# Patient Record
Sex: MALE | URBAN - METROPOLITAN AREA
[De-identification: names, ages, dates, MRNs, and addresses within clinical notes are randomized per-mention and may not be internally consistent; named-entity substitution may affect disease eponyms.]

---

## 2023-07-14 ENCOUNTER — OFFICE VISIT (OUTPATIENT)
Dept: URBAN - METROPOLITAN AREA CLINIC 33 | Facility: CLINIC | Age: 53
End: 2023-07-14
Payer: COMMERCIAL

## 2023-07-14 ENCOUNTER — LAB OUTSIDE AN ENCOUNTER (OUTPATIENT)
Dept: URBAN - METROPOLITAN AREA CLINIC 33 | Facility: CLINIC | Age: 53
End: 2023-07-14

## 2023-07-14 VITALS
WEIGHT: 182 LBS | BODY MASS INDEX: 26.05 KG/M2 | SYSTOLIC BLOOD PRESSURE: 102 MMHG | HEIGHT: 70 IN | OXYGEN SATURATION: 99 % | DIASTOLIC BLOOD PRESSURE: 70 MMHG | HEART RATE: 62 BPM

## 2023-07-14 DIAGNOSIS — Z12.11 SCREENING FOR COLON CANCER: ICD-10-CM

## 2023-07-14 DIAGNOSIS — R74.8 ELEVATED LIVER ENZYMES: ICD-10-CM

## 2023-07-14 DIAGNOSIS — K76.9 LIVER LESION: ICD-10-CM

## 2023-07-14 PROCEDURE — 99204 OFFICE O/P NEW MOD 45 MIN: CPT | Performed by: STUDENT IN AN ORGANIZED HEALTH CARE EDUCATION/TRAINING PROGRAM

## 2023-07-14 RX ORDER — SUMATRIPTAN SUCCINATE 100 MG/1
1 TABLET AT LEAST 2 HOURS BETWEEN DOSES AS NEEDED TABLET, FILM COATED ORAL TWICE A DAY
Status: ACTIVE | COMMUNITY

## 2023-07-14 RX ORDER — UBIDECARENONE 30 MG
AS DIRECTED CAPSULE ORAL ONCE A DAY
Status: ACTIVE | COMMUNITY

## 2023-07-14 RX ORDER — ESCITALOPRAM OXALATE 10 MG/1
1 TABLET TABLET ORAL ONCE A DAY
Status: ACTIVE | COMMUNITY

## 2023-07-14 NOTE — HPI-TODAY'S VISIT:
53 year old male hyperlipidemia, for abnormal Ct chest. Reports had CT coronary, then led to CT chest for abnormal lung finding, with resulted abnormal liver changes. No family history liver cancer or colon cancer. No prior CRC screening. No alcohol usage. No drug usage history. Weight, appetite stable, and bowels normal. NO UGI symptoms. No abdominal pain. No icterus, jaundice history. Reports hx hyperlipidemia but not on meds. CT coronary was neg.   Lab completed on 05/17/2023 - WBC 6.9 - RBC 5.29 - HGB 15.2 - Creatinine 1.22 - Bilirubin total 1.4 - ALK Phos 43 - AST 38 - ALT 39  CT chest with and without contrast completed 05/24/2023 1-Noncalcified pulmonary nodules bilaterally measuring up to 3 mm. Per 2017 Fleischner society guidelines, the recommendation for a low risk patient with multiple<6 mm noncalcified nodules is no followup scan. In a high risk patient, optional CT scan of the chest at 12 months could be performed.  2- In the right hepatic lobe there is a locus of ill-defined enhancement measuring 1.9x0.9 cm. This is nonspecific and may represent foci shunting/perfusion defect versus a flash filling hemangioma. Consider follow up MRI abdomen without and with IV contrast or CT abdomen with IV contrast to fully evaluate the liver

## 2023-07-25 ENCOUNTER — TELEPHONE ENCOUNTER (OUTPATIENT)
Dept: URBAN - METROPOLITAN AREA CLINIC 33 | Facility: CLINIC | Age: 53
End: 2023-07-25

## 2023-08-08 ENCOUNTER — WEB ENCOUNTER (OUTPATIENT)
Dept: URBAN - METROPOLITAN AREA CLINIC 33 | Facility: CLINIC | Age: 53
End: 2023-08-08

## 2023-08-14 ENCOUNTER — WEB ENCOUNTER (OUTPATIENT)
Dept: URBAN - METROPOLITAN AREA CLINIC 33 | Facility: CLINIC | Age: 53
End: 2023-08-14

## 2023-08-17 ENCOUNTER — WEB ENCOUNTER (OUTPATIENT)
Dept: URBAN - METROPOLITAN AREA CLINIC 33 | Facility: CLINIC | Age: 53
End: 2023-08-17

## 2023-08-18 ENCOUNTER — TELEPHONE ENCOUNTER (OUTPATIENT)
Dept: URBAN - METROPOLITAN AREA CLINIC 35 | Facility: CLINIC | Age: 53
End: 2023-08-18

## 2023-10-06 LAB
ALBUMIN/GLOBULIN RATIO: 1.4
ALBUMIN: 4.2
ALKALINE PHOSPHATASE: 42
ALT (SGPT): 35
AST (SGOT): 35
BILIRUBIN, DIRECT: 0.2
BILIRUBIN, INDIRECT: 1
BILIRUBIN, TOTAL: 1.2
FERRITIN, SERUM: 91
GLOBULIN: 2.9
HEPATITIS A AB, TOTAL: (no result)
HEPATITIS B CORE AB TOTAL: (no result)
HEPATITIS B SURFACE AB IMMUNITY, QN: 42
HEPATITIS B SURFACE ANTIGEN: (no result)
HEPATITIS C ANTIBODY: (no result)
IRON BIND.CAP.(TIBC): 397
IRON SATURATION: 15
IRON: 60
PROTEIN, TOTAL: 7.1
REFLEX TIQ: (no result)
SMOOTH MUSCLE AB SCREEN: NEGATIVE

## 2023-10-08 ENCOUNTER — TELEPHONE ENCOUNTER (OUTPATIENT)
Dept: URBAN - METROPOLITAN AREA CLINIC 35 | Facility: CLINIC | Age: 53
End: 2023-10-08

## 2023-10-13 ENCOUNTER — OFFICE VISIT (OUTPATIENT)
Dept: URBAN - METROPOLITAN AREA CLINIC 33 | Facility: CLINIC | Age: 53
End: 2023-10-13

## 2023-11-10 ENCOUNTER — DASHBOARD ENCOUNTERS (OUTPATIENT)
Age: 53
End: 2023-11-10

## 2023-11-10 ENCOUNTER — LAB OUTSIDE AN ENCOUNTER (OUTPATIENT)
Dept: URBAN - METROPOLITAN AREA CLINIC 33 | Facility: CLINIC | Age: 53
End: 2023-11-10

## 2023-11-10 ENCOUNTER — OFFICE VISIT (OUTPATIENT)
Dept: URBAN - METROPOLITAN AREA CLINIC 33 | Facility: CLINIC | Age: 53
End: 2023-11-10
Payer: COMMERCIAL

## 2023-11-10 VITALS
OXYGEN SATURATION: 99 % | HEART RATE: 83 BPM | WEIGHT: 182 LBS | DIASTOLIC BLOOD PRESSURE: 80 MMHG | HEIGHT: 70 IN | SYSTOLIC BLOOD PRESSURE: 110 MMHG | BODY MASS INDEX: 26.05 KG/M2

## 2023-11-10 DIAGNOSIS — Z12.11 SCREENING FOR COLON CANCER: ICD-10-CM

## 2023-11-10 DIAGNOSIS — R74.8 ELEVATED LIVER ENZYMES: ICD-10-CM

## 2023-11-10 DIAGNOSIS — K76.9 LIVER LESION: ICD-10-CM

## 2023-11-10 DIAGNOSIS — R79.0 ABNORMAL IRON SATURATION: ICD-10-CM

## 2023-11-10 PROBLEM — 300331000: Status: ACTIVE | Noted: 2023-07-14

## 2023-11-10 PROCEDURE — 99213 OFFICE O/P EST LOW 20 MIN: CPT | Performed by: STUDENT IN AN ORGANIZED HEALTH CARE EDUCATION/TRAINING PROGRAM

## 2023-11-10 RX ORDER — UBIDECARENONE 30 MG
AS DIRECTED CAPSULE ORAL ONCE A DAY
Status: ACTIVE | COMMUNITY

## 2023-11-10 RX ORDER — ESCITALOPRAM OXALATE 10 MG/1
1 TABLET TABLET ORAL ONCE A DAY
Status: ACTIVE | COMMUNITY

## 2023-11-10 RX ORDER — SODIUM PICOSULFATE, MAGNESIUM OXIDE, AND ANHYDROUS CITRIC ACID 12; 3.5; 1 G/175ML; G/175ML; MG/175ML
175 ML THE FIRST DOSE THE EVENING BEFORE AND SECOND DOSE THE MORNING OF COLONOSCOPY LIQUID ORAL ONCE A DAY
Qty: 350 | OUTPATIENT
Start: 2023-11-10 | End: 2023-11-12

## 2023-11-10 RX ORDER — SUMATRIPTAN SUCCINATE 100 MG/1
1 TABLET AT LEAST 2 HOURS BETWEEN DOSES AS NEEDED TABLET, FILM COATED ORAL TWICE A DAY
Status: ACTIVE | COMMUNITY

## 2023-11-10 NOTE — HPI-TODAY'S VISIT:
53 year old male patient presents today for a follow up from last visit. Doing well. No abdominal pain. No heartburn. No difficulty swallowing. No constipation, no diarrhea. No rectal bleeding. No black stools. He reports ready to proceed with colonoscopy scheduling now.   MRI Liver 08/02/2023 Angioma in the right lobe of the liver and a small simple cyst in the left lobe of the liver No suspicious hepatic lesions  Lab 10/03/2023 Hep B surface Antigen Non- reactive; Hep A Ab, Total  Non-reactive Heb B Core Ab Toal Non-reactive Bili, Tot 1.2; Alk, Phos 42; AST 35; ALt 35 Hep B surface Ab Quant 42; Ferritin 91; Iron, Tot 60; ; % sat 15(L) Hep C Anti Body )refl) Non-reactive Smooth Muscle AB Screen negative    Last visit 07/14/2023 53 year old male hyperlipidemia, for abnormal Ct chest. Reports had CT coronary, then led to CT chest for abnormal lung finding, with resulted abnormal liver changes. No family history liver cancer or colon cancer. No prior CRC screening. No alcohol usage. No drug usage history. Weight, appetite stable, and bowels normal. NO UGI symptoms. No abdominal pain. No icterus, jaundice history. Reports hx hyperlipidemia but not on meds. CT coronary was neg.   Lab completed on 05/17/2023 - WBC 6.9 - RBC 5.29 - HGB 15.2 - Creatinine 1.22 - Bilirubin total 1.4 - ALK Phos 43 - AST 38 - ALT 39  CT chest with and without contrast completed 05/24/2023 1-Noncalcified pulmonary nodules bilaterally measuring up to 3 mm. Per 2017 Fleischner society guidelines, the recommendation for a low risk patient with multiple<6 mm noncalcified nodules is no followup scan. In a high risk patient, optional CT scan of the chest at 12 months could be performed.  2- In the right hepatic lobe there is a locus of ill-defined enhancement measuring 1.9x0.9 cm. This is nonspecific and may represent foci shunting/perfusion defect versus a flash filling hemangioma. Consider follow up MRI abdomen without and with IV contrast or CT abdomen with IV contrast to fully evaluate the liver

## 2023-12-05 ENCOUNTER — WEB ENCOUNTER (OUTPATIENT)
Dept: URBAN - METROPOLITAN AREA CLINIC 33 | Facility: CLINIC | Age: 53
End: 2023-12-05

## 2023-12-11 ENCOUNTER — OUT OF OFFICE VISIT (OUTPATIENT)
Dept: URBAN - METROPOLITAN AREA SURGERY CENTER 8 | Facility: SURGERY CENTER | Age: 53
End: 2023-12-11
Payer: COMMERCIAL

## 2023-12-11 DIAGNOSIS — Z12.11 COLON CANCER SCREENING: ICD-10-CM

## 2023-12-11 DIAGNOSIS — K64.8 OTHER HEMORRHOIDS: ICD-10-CM

## 2023-12-11 PROCEDURE — 45378 DIAGNOSTIC COLONOSCOPY: CPT | Performed by: STUDENT IN AN ORGANIZED HEALTH CARE EDUCATION/TRAINING PROGRAM

## 2023-12-11 PROCEDURE — G8907 PT DOC NO EVENTS ON DISCHARG: HCPCS | Performed by: STUDENT IN AN ORGANIZED HEALTH CARE EDUCATION/TRAINING PROGRAM

## 2023-12-11 PROCEDURE — 00811 ANES LWR INTST NDSC NOS: CPT | Performed by: NURSE ANESTHETIST, CERTIFIED REGISTERED

## 2023-12-11 RX ORDER — SUMATRIPTAN SUCCINATE 100 MG/1
1 TABLET AT LEAST 2 HOURS BETWEEN DOSES AS NEEDED TABLET, FILM COATED ORAL TWICE A DAY
Status: ACTIVE | COMMUNITY

## 2023-12-11 RX ORDER — ESCITALOPRAM OXALATE 10 MG/1
1 TABLET TABLET ORAL ONCE A DAY
Status: ACTIVE | COMMUNITY

## 2023-12-11 RX ORDER — UBIDECARENONE 30 MG
AS DIRECTED CAPSULE ORAL ONCE A DAY
Status: ACTIVE | COMMUNITY